# Patient Record
Sex: FEMALE | Race: BLACK OR AFRICAN AMERICAN
[De-identification: names, ages, dates, MRNs, and addresses within clinical notes are randomized per-mention and may not be internally consistent; named-entity substitution may affect disease eponyms.]

---

## 2019-12-10 ENCOUNTER — HOSPITAL ENCOUNTER (OUTPATIENT)
Dept: HOSPITAL 63 - MAMMO | Age: 74
Discharge: HOME | End: 2019-12-10
Attending: PHYSICIAN ASSISTANT
Payer: MEDICARE

## 2019-12-10 DIAGNOSIS — Z12.31: Primary | ICD-10-CM

## 2019-12-10 DIAGNOSIS — N63.11: ICD-10-CM

## 2019-12-10 PROCEDURE — 77063 BREAST TOMOSYNTHESIS BI: CPT

## 2019-12-10 PROCEDURE — 77067 SCR MAMMO BI INCL CAD: CPT

## 2019-12-18 NOTE — RAD
DATE: December 10, 2019



EXAM: MAMMO BILL SCREENING BILATERAL



HISTORY: Screening study.



COMPARISON: [facility] November 26, 2018.



This study was interpreted with the benefit of Computerized Aided Detection

(CAD).



2-D digital mammographic views of both breasts were performed in the CC and

MLO projections. 3-D digital tomosynthesis images of both breasts were

performed in the CC and MLO projections and reviewed on a computer

workstation.



FINDINGS:



Breast Density: HETERO The breast parenchyma is heterogenously dense, which

could reduce sensitivity of mammography. Breast parenchyma level C.. There is

an enlarging nodule within the upper outer quadrant of the right breast nodule

measuring 7 mm. This is best seen on bill CC image 14 and MLO bill image 15.

It is located 4 cm from the nipple. Left breast nodularity is stable. Biopsy

clip is seen within the medial aspect left breast at nipple level. No

spiculated lesion or architectural distortion or clustering of pleomorphic

microcalcifications are evident on either side otherwise.



IMPRESSION: Enlarging nodule right breast. Recommend right breast sonography.







BI-RADS CATEGORY: 0 INCOMPLETE: NEEDS ADDITIONAL IMAGING EVALUATION AND/OR

PRIOR MAMMOGRAMS FOR COMPARISON.



RECOMMENDED FOLLOW-UP: ADD ADDITIONAL IMAGING



PQRS compliance statement: Patient information was entered into a reminder

system with a target due date now for the next ultrasound.



Mammography is a sensitive method for finding small breast cancers, but it

does not detect them all and is not a substitute for careful clinical

examination.  A negative mammogram does not negate a clinically suspicious

finding and should not result in delay in biopsying a clinically suspicious

abnormality.



"Our facility is accredited by the [employer] Mammography

Program."



The patient's breast density may affect the ability of mammography to detect

breast cancer. There are 4 categories of breast density, A, B, C and D. Breast

density A means that most of the breast tissue is replaced with adipose tissue

and therefore is not dense. Breast density B means that the breast tissue is

mildly dense and scattered. Breast density C means that the breast tissue is

heterogeneously dense. Breast density D means that the breast tissue is very

dense. Breast densities especially C and D may decrease the sensitivity of

mammography to detect breast cancer. Therefore, the patient may benefit from

3-D breast mammography (3D breast tomography) as a part of their screening

mammogram. Insurance may or may not pay for this additional imaging. The

patient's breast density based on today's mammogram is category C.

## 2019-12-31 ENCOUNTER — HOSPITAL ENCOUNTER (OUTPATIENT)
Dept: HOSPITAL 63 - US | Age: 74
Discharge: HOME | End: 2019-12-31
Attending: FAMILY MEDICINE
Payer: MEDICARE

## 2019-12-31 DIAGNOSIS — N60.11: Primary | ICD-10-CM

## 2019-12-31 PROCEDURE — 76641 ULTRASOUND BREAST COMPLETE: CPT

## 2019-12-31 NOTE — RAD
Examination: BREAST RIGHT

 

History: Abnormal mammogram

 

Comparison/Correlation: Screening mammogram 10/10/2019

 

Findings: Right upper-outer breast ultrasound was performed. Septated 

cystic structure measuring 0.7 cm x 0.6 cm x 0.4 centimeter. No flow 

evident within it. This appears to correspond with the mammographic 

finding.

 

 

Impression:

BI-RADS Category 3-probably benign. Six-month follow-up right unilateral 

mammogram and six-month follow-up ultrasound exam recommended to assess 

stability.

 

Electronically signed by: Damion Linares MD (12/31/2019 1:27 PM) [id number]

## 2020-01-14 ENCOUNTER — HOSPITAL ENCOUNTER (OUTPATIENT)
Dept: HOSPITAL 63 - SURG | Age: 75
Discharge: HOME | End: 2020-01-14
Attending: INTERNAL MEDICINE
Payer: MEDICARE

## 2020-01-14 VITALS
SYSTOLIC BLOOD PRESSURE: 132 MMHG | DIASTOLIC BLOOD PRESSURE: 64 MMHG | DIASTOLIC BLOOD PRESSURE: 64 MMHG | SYSTOLIC BLOOD PRESSURE: 132 MMHG | SYSTOLIC BLOOD PRESSURE: 132 MMHG | DIASTOLIC BLOOD PRESSURE: 64 MMHG | SYSTOLIC BLOOD PRESSURE: 132 MMHG | DIASTOLIC BLOOD PRESSURE: 64 MMHG

## 2020-01-14 DIAGNOSIS — Z88.5: ICD-10-CM

## 2020-01-14 DIAGNOSIS — N18.3: ICD-10-CM

## 2020-01-14 DIAGNOSIS — Z88.1: ICD-10-CM

## 2020-01-14 DIAGNOSIS — M19.90: ICD-10-CM

## 2020-01-14 DIAGNOSIS — Z90.710: ICD-10-CM

## 2020-01-14 DIAGNOSIS — Z79.899: ICD-10-CM

## 2020-01-14 DIAGNOSIS — K63.89: ICD-10-CM

## 2020-01-14 DIAGNOSIS — Z98.890: ICD-10-CM

## 2020-01-14 DIAGNOSIS — I12.9: ICD-10-CM

## 2020-01-14 DIAGNOSIS — Z79.82: ICD-10-CM

## 2020-01-14 DIAGNOSIS — Z12.11: Primary | ICD-10-CM

## 2020-01-14 DIAGNOSIS — Z88.6: ICD-10-CM

## 2020-01-14 DIAGNOSIS — Z87.39: ICD-10-CM

## 2020-01-14 DIAGNOSIS — Z86.010: ICD-10-CM

## 2020-01-14 PROCEDURE — 45378 DIAGNOSTIC COLONOSCOPY: CPT

## 2021-01-05 ENCOUNTER — HOSPITAL ENCOUNTER (OUTPATIENT)
Dept: HOSPITAL 63 - MAMMO | Age: 76
End: 2021-01-05
Attending: FAMILY MEDICINE
Payer: MEDICARE

## 2021-01-05 DIAGNOSIS — R92.1: Primary | ICD-10-CM

## 2021-01-05 DIAGNOSIS — N63.10: ICD-10-CM

## 2021-01-05 DIAGNOSIS — N60.01: ICD-10-CM

## 2021-01-05 PROCEDURE — 77066 DX MAMMO INCL CAD BI: CPT

## 2021-01-05 PROCEDURE — 76641 ULTRASOUND BREAST COMPLETE: CPT

## 2021-01-05 NOTE — RAD
EXAM: Bilateral digital diagnostic mammogram with tomosynthesis; right breast sonogram.



HISTORY: 75-year-old female presents for follow-up evaluation of a suspected benign cystic lesion wit
hin the right breast demonstrated on a sonogram performed 12/31/2019. The patient did not return at t
he 6 month recommended follow up interval and is not due for bilateral mammography.



TECHNIQUE: Full-field digital craniocaudal and mediolateral oblique 2D and 3D tomosynthesis images of
 both breasts are obtained for evaluation. Computer aided detection was applied. Sonographic imaging 
of the right breast targeted to the site of prior findings was performed



COMPARISON: 12/31/2019 and 12/10/2019



BREAST PARENCHYMAL DENSITY: Level B - Scattered fibroglandular densities.



FINDINGS: There is no new suspicious mass, microcalcification or region of architectural distortion. 
There are multiple scattered benign-appearing areas of nodularity throughout both breasts. There are 
stable areas of asymmetry when allowing for differences in patient positioning. There are few benign 
calcifications.



Sonographic imaging of the right breast demonstrates a stable 7 mm lobulated cystic lesion with inter
nal echoes and posterior through transmission at the 9:30 position 4 cm from the nipple, the appearan
ce of which favors a cluster of cysts. This demonstrates no suspicious solid lesion component. There 
are benign axillary lymph nodes.



IMPRESSION: 

1. Stable benign-appearing cystic lesion measuring 7 mm at the 9:30 position of the right breast 4 cm
 from the nipple, there is which favors a cluster of cysts.

2. No new suspicious mammographic or sonographic finding.

3. BI-RADS Category 2: Benign finding(s). 



RECOMMENDATION: Annual mammography is recommended.



If your mammogram demonstrates that you have dense breast tissue, which could hide abnormalities, and
 if you have other risk factors for breast cancer that have been identified, you might benefit from s
upplemental screening tests that may be suggested by your ordering physician.  Dense breast tissue, i
n and of itself, is a relatively common condition.  This information is not provided to cause undue c
oncern, but rather to raise your awareness and to promote discussion with your physician regarding th
e presence of other risk factors, in addition to dense breast tissue. A report of your mammography re
sults will be sent to you and your physician.  You should contact your physician if you have any ques
tions or concerns regarding this report.  



Mammography is a sensitive method for finding small breast cancers, but it does not detect them all a
nd is not a substitute for careful clinical examination.  A negative mammogram does not negate a clin
ically suspicious finding and should not result in delay in biopsying a clinically suspicious abnorma
lity.



PQRS compliance statement -  Patient information was entered into a reminder system with a target due
 date for the next mammogram. 



"Our facility is accredited by the [employer] Mammography Program."



Electronically signed by: Leticia Celis MD (1/5/2021 2:54 PM) [id number]

## 2021-01-05 NOTE — RAD
EXAM: Bilateral digital diagnostic mammogram with tomosynthesis; right breast sonogram.



HISTORY: 75-year-old female presents for follow-up evaluation of a suspected benign cystic lesion wit
hin the right breast demonstrated on a sonogram performed 12/31/2019. The patient did not return at t
he 6 month recommended follow up interval and is not due for bilateral mammography.



TECHNIQUE: Full-field digital craniocaudal and mediolateral oblique 2D and 3D tomosynthesis images of
 both breasts are obtained for evaluation. Computer aided detection was applied. Sonographic imaging 
of the right breast targeted to the site of prior findings was performed



COMPARISON: 12/31/2019 and 12/10/2019



BREAST PARENCHYMAL DENSITY: Level B - Scattered fibroglandular densities.



FINDINGS: There is no new suspicious mass, microcalcification or region of architectural distortion. 
There are multiple scattered benign-appearing areas of nodularity throughout both breasts. There are 
stable areas of asymmetry when allowing for differences in patient positioning. There are few benign 
calcifications.



Sonographic imaging of the right breast demonstrates a stable 7 mm lobulated cystic lesion with inter
nal echoes and posterior through transmission at the 9:30 position 4 cm from the nipple, the appearan
ce of which favors a cluster of cysts. This demonstrates no suspicious solid lesion component. There 
are benign axillary lymph nodes.



IMPRESSION: 

1. Stable benign-appearing cystic lesion measuring 7 mm at the 9:30 position of the right breast 4 cm
 from the nipple, there is which favors a cluster of cysts.

2. No new suspicious mammographic or sonographic finding.

3. BI-RADS Category 2: Benign finding(s). 



RECOMMENDATION: Annual mammography is recommended.



If your mammogram demonstrates that you have dense breast tissue, which could hide abnormalities, and
 if you have other risk factors for breast cancer that have been identified, you might benefit from s
upplemental screening tests that may be suggested by your ordering physician.  Dense breast tissue, i
n and of itself, is a relatively common condition.  This information is not provided to cause undue c
oncern, but rather to raise your awareness and to promote discussion with your physician regarding th
e presence of other risk factors, in addition to dense breast tissue. A report of your mammography re
sults will be sent to you and your physician.  You should contact your physician if you have any ques
tions or concerns regarding this report.  



Mammography is a sensitive method for finding small breast cancers, but it does not detect them all a
nd is not a substitute for careful clinical examination.  A negative mammogram does not negate a clin
ically suspicious finding and should not result in delay in biopsying a clinically suspicious abnorma
lity.



PQRS compliance statement -  Patient information was entered into a reminder system with a target due
 date for the next mammogram. 



"Our facility is accredited by the [employer] Mammography Program."



Electronically signed by: Leticia Celis MD (1/5/2021 2:54 PM) [id number]

## 2021-11-08 ENCOUNTER — HOSPITAL ENCOUNTER (OUTPATIENT)
Dept: HOSPITAL 63 - LAB | Age: 76
End: 2021-11-08
Attending: NURSE PRACTITIONER
Payer: MEDICARE

## 2021-11-08 DIAGNOSIS — E83.52: ICD-10-CM

## 2021-11-08 DIAGNOSIS — N18.2: ICD-10-CM

## 2021-11-08 DIAGNOSIS — I12.9: Primary | ICD-10-CM

## 2021-11-08 LAB
ALBUMIN SERPL-MCNC: 3.4 G/DL (ref 3.4–5)
ANION GAP SERPL CALC-SCNC: 2 MMOL/L (ref 6–14)
APTT PPP: YELLOW S
BACTERIA #/AREA URNS HPF: 0 /HPF
BASOPHILS # BLD AUTO: 0 X10^3/UL (ref 0–0.2)
BASOPHILS NFR BLD: 1 % (ref 0–3)
BILIRUB UR QL STRIP: (no result)
CA-I SERPL ISE-MCNC: 14 MG/DL (ref 7–20)
CALCIUM PTH: 10.3 MG/DL (ref 8.7–10.3)
CALCIUM SERPL-MCNC: 9.7 MG/DL (ref 8.5–10.1)
CHLORIDE SERPL-SCNC: 105 MMOL/L (ref 98–107)
CO2 SERPL-SCNC: 32 MMOL/L (ref 21–32)
CREAT SERPL-MCNC: 1.3 MG/DL (ref 0.6–1)
CREATININE PTH: 1.23 MG/DL (ref 0.57–1)
EOSINOPHIL NFR BLD: 0.1 X10^3/UL (ref 0–0.7)
EOSINOPHIL NFR BLD: 2 % (ref 0–3)
ERYTHROCYTE [DISTWIDTH] IN BLOOD BY AUTOMATED COUNT: 13.5 % (ref 11.5–14.5)
FIBRINOGEN PPP-MCNC: CLEAR MG/DL
GFR SERPLBLD BASED ON 1.73 SQ M-ARVRAT: 47.9 ML/MIN
GLUCOSE SERPL-MCNC: 94 MG/DL (ref 70–99)
GLUCOSE UR STRIP-MCNC: (no result) MG/DL
HCT VFR BLD CALC: 38.9 % (ref 36–47)
HGB BLD-MCNC: 13.1 G/DL (ref 12–15.5)
LYMPHOCYTES # BLD: 1.7 X10^3/UL (ref 1–4.8)
LYMPHOCYTES NFR BLD AUTO: 34 % (ref 24–48)
MAGNESIUM SERPL-MCNC: 1.5 MG/DL (ref 1.8–2.4)
MCH RBC QN AUTO: 33 PG (ref 25–35)
MCHC RBC AUTO-ENTMCNC: 34 G/DL (ref 31–37)
MCV RBC AUTO: 99 FL (ref 79–100)
MONO #: 0.6 X10^3/UL (ref 0–1.1)
MONOCYTES NFR BLD: 12 % (ref 0–9)
NEUT #: 2.5 X10^3UL (ref 1.8–7.7)
NEUTROPHILS NFR BLD AUTO: 51 % (ref 31–73)
NITRITE UR QL STRIP: (no result)
PHOSPHATE SERPL-MCNC: 2.8 MG/DL (ref 2.6–4.7)
PLATELET # BLD AUTO: 190 X10^3/UL (ref 140–400)
POTASSIUM SERPL-SCNC: 4.5 MMOL/L (ref 3.5–5.1)
PTH-INTACT SERPL-MCNC: 49 PG/ML (ref 15–65)
RBC # BLD AUTO: 3.92 X10^6/UL (ref 3.5–5.4)
RBC #/AREA URNS HPF: (no result) /HPF (ref 0–2)
SODIUM SERPL-SCNC: 139 MMOL/L (ref 136–145)
SP GR UR STRIP: 1.01
SQUAMOUS #/AREA URNS LPF: (no result) /LPF
URATE SERPL-MCNC: 6.8 MG/DL (ref 2.6–6)
UROBILINOGEN UR-MCNC: 0.2 MG/DL
WBC # BLD AUTO: 4.9 X10^3/UL (ref 4–11)
WBC #/AREA URNS HPF: 0 /HPF (ref 0–4)

## 2021-11-08 PROCEDURE — 80069 RENAL FUNCTION PANEL: CPT

## 2021-11-08 PROCEDURE — 82306 VITAMIN D 25 HYDROXY: CPT

## 2021-11-08 PROCEDURE — 85025 COMPLETE CBC W/AUTO DIFF WBC: CPT

## 2021-11-08 PROCEDURE — 83970 ASSAY OF PARATHORMONE: CPT

## 2021-11-08 PROCEDURE — 36415 COLL VENOUS BLD VENIPUNCTURE: CPT

## 2021-11-08 PROCEDURE — 84550 ASSAY OF BLOOD/URIC ACID: CPT

## 2021-11-08 PROCEDURE — 83735 ASSAY OF MAGNESIUM: CPT

## 2021-11-08 PROCEDURE — 81001 URINALYSIS AUTO W/SCOPE: CPT

## 2021-11-30 ENCOUNTER — HOSPITAL ENCOUNTER (OUTPATIENT)
Dept: HOSPITAL 63 - LAB | Age: 76
End: 2021-11-30
Attending: NURSE PRACTITIONER
Payer: MEDICARE

## 2021-11-30 DIAGNOSIS — N18.2: ICD-10-CM

## 2021-11-30 DIAGNOSIS — E83.52: Primary | ICD-10-CM

## 2021-11-30 DIAGNOSIS — I12.9: ICD-10-CM

## 2021-11-30 LAB
ALBUMIN SERPL-MCNC: 3.3 G/DL (ref 3.4–5)
ANION GAP SERPL CALC-SCNC: 6 MMOL/L (ref 6–14)
CA-I SERPL ISE-MCNC: 17 MG/DL (ref 7–20)
CALCIUM SERPL-MCNC: 9.6 MG/DL (ref 8.5–10.1)
CHLORIDE SERPL-SCNC: 101 MMOL/L (ref 98–107)
CO2 SERPL-SCNC: 31 MMOL/L (ref 21–32)
CREAT SERPL-MCNC: 1.2 MG/DL (ref 0.6–1)
GFR SERPLBLD BASED ON 1.73 SQ M-ARVRAT: 52.6 ML/MIN
GLUCOSE SERPL-MCNC: 130 MG/DL (ref 70–99)
PHOSPHATE SERPL-MCNC: 2.7 MG/DL (ref 2.6–4.7)
POTASSIUM SERPL-SCNC: 3.9 MMOL/L (ref 3.5–5.1)
SODIUM SERPL-SCNC: 138 MMOL/L (ref 136–145)

## 2021-11-30 PROCEDURE — 80069 RENAL FUNCTION PANEL: CPT

## 2021-11-30 PROCEDURE — 36415 COLL VENOUS BLD VENIPUNCTURE: CPT

## 2021-11-30 PROCEDURE — 84156 ASSAY OF PROTEIN URINE: CPT

## 2021-11-30 PROCEDURE — 82570 ASSAY OF URINE CREATININE: CPT

## 2021-12-01 LAB — CREATININE,RANDOM URINE: 47.6 MG/DL
